# Patient Record
Sex: MALE | Race: WHITE | NOT HISPANIC OR LATINO
[De-identification: names, ages, dates, MRNs, and addresses within clinical notes are randomized per-mention and may not be internally consistent; named-entity substitution may affect disease eponyms.]

---

## 2017-08-11 PROBLEM — Z00.00 ENCOUNTER FOR PREVENTIVE HEALTH EXAMINATION: Status: ACTIVE | Noted: 2017-08-11

## 2021-03-09 ENCOUNTER — APPOINTMENT (OUTPATIENT)
Dept: HEART AND VASCULAR | Facility: CLINIC | Age: 71
End: 2021-03-09
Payer: COMMERCIAL

## 2021-03-09 ENCOUNTER — NON-APPOINTMENT (OUTPATIENT)
Age: 71
End: 2021-03-09

## 2021-03-09 VITALS
HEART RATE: 80 BPM | OXYGEN SATURATION: 98 % | BODY MASS INDEX: 26.67 KG/M2 | HEIGHT: 68 IN | SYSTOLIC BLOOD PRESSURE: 167 MMHG | WEIGHT: 176 LBS | TEMPERATURE: 97 F | RESPIRATION RATE: 14 BRPM | DIASTOLIC BLOOD PRESSURE: 90 MMHG

## 2021-03-09 DIAGNOSIS — I10 ESSENTIAL (PRIMARY) HYPERTENSION: ICD-10-CM

## 2021-03-09 PROCEDURE — 99203 OFFICE O/P NEW LOW 30 MIN: CPT

## 2021-03-09 PROCEDURE — 99072 ADDL SUPL MATRL&STAF TM PHE: CPT

## 2021-03-09 NOTE — ASSESSMENT
[FreeTextEntry1] : 69 yo M with past medical history of hypertension, diabetes, HLD, DM, CVA  here for first evaluation\par \par HTN\par -Uncontrolled today but the patient did not take his medications\par -Please obtain records from his primary cardiologist including recent blood test\par -Stressed importance of medical compliance\par -Echocardiogram\par \par \par HLD\par -obtain lipid panel form primary cardiologist \par \par ROUTINE CARDIOLOGY EXAM \par -Patient has multiple risk factors for CAD, please obtain records from his primary cardiologist.\par -Echocardiogram to assess for any wall motion abnormalities.\par -Consider stress testing/ CTA if  onset of symptoms\par \par -Followup in 2 weeks for review of medications and documentation

## 2021-03-09 NOTE — HISTORY OF PRESENT ILLNESS
[FreeTextEntry1] : 71 yo M with past medical history of hypertension, diabetes, HLD, DM, CVA  here for first evaluation\par the patient was previously by a different cardiologist (Dr. kunz [phone]) but has to change provider due to insurance issues. \par Denies chest pain, shortness of breath, palpitations, syncope, presyncope, LE edema, orthopnea. \par \par Reports walking for miles without any symptoms\par Does not remember which meds he is taking and today he did not take them\par Was told he had CKD and recently evaluated by a nephrologist ( no documentation of labs available) \par \par \par PMH\par HTN\par DM\par HLD\par CVA\par \par ALL\par NKDA\par \par MEDS\par JANUVIA \par BP meds ? \par \par SH\par no smoke, no etoh, no drugs\par \par EKG 3/9/2021\par SR, RBBB, 1st degree AVB

## 2021-03-09 NOTE — PHYSICAL EXAM
[General Appearance - Well Developed] : well developed [Normal Appearance] : normal appearance [Well Groomed] : well groomed [General Appearance - Well Nourished] : well nourished [No Deformities] : no deformities [General Appearance - In No Acute Distress] : no acute distress [Normal Oral Mucosa] : normal oral mucosa [No Oral Pallor] : no oral pallor [No Oral Cyanosis] : no oral cyanosis [Normal Jugular Venous A Waves Present] : normal jugular venous A waves present [Normal Jugular Venous V Waves Present] : normal jugular venous V waves present [No Jugular Venous Butler A Waves] : no jugular venous butler A waves [Heart Rate And Rhythm] : heart rate and rhythm were normal [Heart Sounds] : normal S1 and S2 [Murmurs] : no murmurs present [Respiration, Rhythm And Depth] : normal respiratory rhythm and effort [Exaggerated Use Of Accessory Muscles For Inspiration] : no accessory muscle use [Auscultation Breath Sounds / Voice Sounds] : lungs were clear to auscultation bilaterally [Abdomen Soft] : soft [Abdomen Tenderness] : non-tender [Abdomen Mass (___ Cm)] : no abdominal mass palpated [Abnormal Walk] : normal gait [Gait - Sufficient For Exercise Testing] : the gait was sufficient for exercise testing [Nail Clubbing] : no clubbing of the fingernails [Cyanosis, Localized] : no localized cyanosis [Petechial Hemorrhages (___cm)] : no petechial hemorrhages [] : no ischemic changes

## 2021-03-16 ENCOUNTER — APPOINTMENT (OUTPATIENT)
Dept: HEART AND VASCULAR | Facility: CLINIC | Age: 71
End: 2021-03-16
Payer: COMMERCIAL

## 2021-03-16 PROCEDURE — 93306 TTE W/DOPPLER COMPLETE: CPT

## 2021-03-16 PROCEDURE — 99072 ADDL SUPL MATRL&STAF TM PHE: CPT

## 2021-03-30 ENCOUNTER — APPOINTMENT (OUTPATIENT)
Dept: HEART AND VASCULAR | Facility: CLINIC | Age: 71
End: 2021-03-30
Payer: MEDICARE

## 2021-03-30 VITALS
OXYGEN SATURATION: 98 % | DIASTOLIC BLOOD PRESSURE: 85 MMHG | WEIGHT: 176 LBS | HEART RATE: 72 BPM | HEIGHT: 68 IN | BODY MASS INDEX: 26.67 KG/M2 | SYSTOLIC BLOOD PRESSURE: 142 MMHG | TEMPERATURE: 98.1 F | RESPIRATION RATE: 15 BRPM

## 2021-03-30 PROCEDURE — 99214 OFFICE O/P EST MOD 30 MIN: CPT

## 2021-03-30 PROCEDURE — 99072 ADDL SUPL MATRL&STAF TM PHE: CPT

## 2021-03-30 NOTE — PHYSICAL EXAM
[General Appearance - Well Developed] : well developed [Normal Appearance] : normal appearance [Well Groomed] : well groomed [General Appearance - Well Nourished] : well nourished [No Deformities] : no deformities [General Appearance - In No Acute Distress] : no acute distress [Normal Oral Mucosa] : normal oral mucosa [No Oral Pallor] : no oral pallor [No Oral Cyanosis] : no oral cyanosis [Normal Jugular Venous A Waves Present] : normal jugular venous A waves present [Normal Jugular Venous V Waves Present] : normal jugular venous V waves present [No Jugular Venous Butler A Waves] : no jugular venous butler A waves [Heart Rate And Rhythm] : heart rate and rhythm were normal [Heart Sounds] : normal S1 and S2 [Murmurs] : no murmurs present [Respiration, Rhythm And Depth] : normal respiratory rhythm and effort [Exaggerated Use Of Accessory Muscles For Inspiration] : no accessory muscle use [Auscultation Breath Sounds / Voice Sounds] : lungs were clear to auscultation bilaterally [Abdomen Soft] : soft [Abdomen Tenderness] : non-tender [Abdomen Mass (___ Cm)] : no abdominal mass palpated [Abnormal Walk] : normal gait [Gait - Sufficient For Exercise Testing] : the gait was sufficient for exercise testing [Cyanosis, Localized] : no localized cyanosis [Nail Clubbing] : no clubbing of the fingernails [Petechial Hemorrhages (___cm)] : no petechial hemorrhages [] : no ischemic changes

## 2021-03-30 NOTE — HISTORY OF PRESENT ILLNESS
[FreeTextEntry1] : Cardiology Dr. THI THIBODEAUX  tel:  [phone]\par Nephrology : Dr. ALY HARRISON tel [phone] \par Internal Medicien: Fuad Kerns tel  [phone]\par \par \par 71 yo M with past medical history of hypertension, diabetes, HLD, DM, CVA  here for follow up. \par the patient was previously by a different cardiologist (Dr. Jones [phone]) but has to change provider due to insurance issues. \par Denies chest pain, shortness of breath, palpitations, syncope, presyncope, LE edema, orthopnea. \par \par Reports walking for miles without any symptoms but reports he is now experiencing ROWE and has to walk more slowly than before. \par \par \par PMH\par HTN\par DM\par HLD\par CVA\par \par ALL\par NKDA\par \par MEDS\par lisinopril 40 mg daily \par hydralazine 25 mg po tid \par amlodipine 10 mg daily \par metoprolol succinate 100 mg daily \par atorvastatin 40 mg daily \par januvia\par vit D \par \par \par SH\par no smoke, no etoh, no drugs\par \par EKG 3/9/2021\par SR, RBBB, 1st degree AVB\par \par ECHO 3/16/2021\par LVEF is 55-60%\par Moderate LVH and\par Mild to moderate AI\par Aortic  valve is calcified, cannot rule out bicuspid valve\par Reccomend CTA or cardiac MRI for better assessment of aortic valve and proximal ascending aorta (mildly dilated as per report)\par \par LABS 3/9/2021\par creat 2.5\par

## 2021-03-30 NOTE — ASSESSMENT
[FreeTextEntry1] : 69 yo M with past medical history of hypertension, diabetes, HLD, DM, CVA  here for follow up \par \par ROWE\par - in the setting of his factors, abnormal EKG and dyspnea on exertion will obtain a nuclear  stress test to rule out CAD\par \par HTN\par -better controlled today \par -cont with current medications \par -appreciate renal recs: re continuing  ACEi\par -Stressed importance of medical compliance\par -Echocardiogram as above\par \par HLD\par -obtain lipid panel form primary cardiologist \par \par Followup in 3 weeks for nuc stress test results \par \par

## 2021-05-04 ENCOUNTER — NON-APPOINTMENT (OUTPATIENT)
Age: 71
End: 2021-05-04

## 2021-05-04 ENCOUNTER — APPOINTMENT (OUTPATIENT)
Dept: HEART AND VASCULAR | Facility: CLINIC | Age: 71
End: 2021-05-04

## 2021-05-06 ENCOUNTER — APPOINTMENT (OUTPATIENT)
Dept: HEART AND VASCULAR | Facility: CLINIC | Age: 71
End: 2021-05-06
Payer: MEDICARE

## 2021-05-06 VITALS
WEIGHT: 176 LBS | HEART RATE: 81 BPM | RESPIRATION RATE: 14 BRPM | HEIGHT: 68 IN | OXYGEN SATURATION: 98 % | DIASTOLIC BLOOD PRESSURE: 85 MMHG | BODY MASS INDEX: 26.67 KG/M2 | SYSTOLIC BLOOD PRESSURE: 164 MMHG | TEMPERATURE: 98.5 F

## 2021-05-06 VITALS — HEART RATE: 82 BPM | SYSTOLIC BLOOD PRESSURE: 140 MMHG | DIASTOLIC BLOOD PRESSURE: 78 MMHG

## 2021-05-06 PROCEDURE — 99215 OFFICE O/P EST HI 40 MIN: CPT

## 2021-05-06 PROCEDURE — 99072 ADDL SUPL MATRL&STAF TM PHE: CPT

## 2021-05-06 NOTE — PHYSICAL EXAM
[General Appearance - Well Developed] : well developed [Normal Appearance] : normal appearance [Well Groomed] : well groomed [General Appearance - Well Nourished] : well nourished [No Deformities] : no deformities [General Appearance - In No Acute Distress] : no acute distress [Normal Oral Mucosa] : normal oral mucosa [No Oral Pallor] : no oral pallor [No Oral Cyanosis] : no oral cyanosis [Normal Jugular Venous A Waves Present] : normal jugular venous A waves present [Normal Jugular Venous V Waves Present] : normal jugular venous V waves present [No Jugular Venous Butler A Waves] : no jugular venous butler A waves [Heart Rate And Rhythm] : heart rate and rhythm were normal [Heart Sounds] : normal S1 and S2 [Murmurs] : no murmurs present [Exaggerated Use Of Accessory Muscles For Inspiration] : no accessory muscle use [Respiration, Rhythm And Depth] : normal respiratory rhythm and effort [Auscultation Breath Sounds / Voice Sounds] : lungs were clear to auscultation bilaterally [Abdomen Soft] : soft [Abdomen Tenderness] : non-tender [Abdomen Mass (___ Cm)] : no abdominal mass palpated [Abnormal Walk] : normal gait [Gait - Sufficient For Exercise Testing] : the gait was sufficient for exercise testing [Nail Clubbing] : no clubbing of the fingernails [Cyanosis, Localized] : no localized cyanosis [Petechial Hemorrhages (___cm)] : no petechial hemorrhages [] : no ischemic changes

## 2021-05-12 ENCOUNTER — APPOINTMENT (OUTPATIENT)
Dept: HEART AND VASCULAR | Facility: CLINIC | Age: 71
End: 2021-05-12
Payer: MEDICARE

## 2021-05-12 DIAGNOSIS — Z01.812 ENCOUNTER FOR PREPROCEDURAL LABORATORY EXAMINATION: ICD-10-CM

## 2021-05-12 PROCEDURE — ZZZZZ: CPT

## 2021-05-13 ENCOUNTER — APPOINTMENT (OUTPATIENT)
Dept: HEART AND VASCULAR | Facility: CLINIC | Age: 71
End: 2021-05-13
Payer: MEDICARE

## 2021-05-13 DIAGNOSIS — R94.39 ABNORMAL RESULT OF OTHER CARDIOVASCULAR FUNCTION STUDY: ICD-10-CM

## 2021-05-13 DIAGNOSIS — R06.00 DYSPNEA, UNSPECIFIED: ICD-10-CM

## 2021-05-13 LAB — SARS-COV-2 N GENE NPH QL NAA+PROBE: NOT DETECTED

## 2021-05-13 PROCEDURE — 93306 TTE W/DOPPLER COMPLETE: CPT

## 2021-05-13 NOTE — ASSESSMENT
[FreeTextEntry1] : 71 yo M with past medical history of hypertension, diabetes, HLD, DM, CVA  here for follow up and nuc stress test results \par \par \par ROWE\par - in the setting of his factors, abnormal EKG and dyspnea on exertion, with positive nuc stress test, will schedule for cardiac catheterization at [facility] on 5/14\par -Covid testing as per hospital protocol in [place]\par -obtain labs from nephrology (reports recent labs and visit on 5/7)\par -cont with aspirin, atorvastatin, beta-blocker\par \par \par HFrEF\par -newly diagnosed on recent nuc stress test (normal EF on echo)\par -the patient is euvolemic and NYHA I-II symptoms\par -cath as above to define coronary anatomy\par -consider repeating echo to assess EF in the setting of discrepancy \par -cont with ACEi, BB, hydralazine\par -if EF confirmed depressed will start entresto pending lab results for renal function\par \par \par HTN\par -better controlled today on second BP check\par -cont with current medications \par -appreciate renal recs: re continuing  ACEi\par -Stressed importance of medical compliance\par - repeat Echocardiogram as above\par \par HLD\par -obtain lipid panel form primary cardiologist \par \par Followup post cath\par \par **ADDENDUM 5/13/2021**\par creatinine 2.73 on 5/12/2021 (uptrending)\par in the setting of worsening renal failure cardiac catheterization will be postponed\par recc renal evaluation, recc med compliance, stop ACEi (uptitrated hydralazine to 50 mg po tid)\par -recc also repeat echo in order to assess LVEF ( normal on echo 3/16/2021, severely depressed on nuc 5/2/2021)\par -f/u in 2 weeks \par \par

## 2021-05-13 NOTE — HISTORY OF PRESENT ILLNESS
[FreeTextEntry1] : Cardiology Dr. THI THIBODEAUX  tel:  [phone] \par Nephrology : Dr. ALY HARRISON tel [phone] \par Internal Medicien: Fuad Kerns tel  [phone]\par \par \par  \par 69 yo M with past medical history of hypertension, diabetes, HLD, DM, CVA  here for follow up for nuc stress results. \par The patient reports mild ROWE when climbing stairs\par Denies chest pain, palpitations, syncope, presyncope, LE edema, orthopnea.  \par Had positive nuc stress test (results as above)\par Reports compliance with his meds, will see nephrology tomorrow\par \par \par \par As per previous note: \par 69 yo M with past medical history of hypertension, diabetes, HLD, DM, CVA  here for follow up. \par the patient was previously by a different cardiologist (Dr. Jones [phone]) but has to change provider due to insurance issues. \par Denies chest pain, shortness of breath, palpitations, syncope, presyncope, LE edema, orthopnea. \par \par Reports walking for miles without any symptoms but reports he is now experiencing ROWE and has to walk more slowly than before. \par \par \par PMH\par HTN\par DM\par HLD\par CVA\par \par ALL\par NKDA\par \par MEDS\par lisinopril 40 mg daily \par hydralazine 25 mg po tid \par amlodipine 10 mg daily \par metoprolol succinate 100 mg daily \par atorvastatin 40 mg daily \par januvia\par vit D \par \par \par SH\par no smoke, no etoh, no drugs\par \par EKG 3/9/2021\par SR, RBBB, 1st degree AVB\par \par ECHO 3/16/2021\par LVEF is 55-60%\par Moderate LVH and\par Mild to moderate AI\par Aortic  valve is calcified, cannot rule out bicuspid valve\par Reccomend CTA or cardiac MRI for better assessment of aortic valve and proximal ascending aorta (mildly dilated as per report)\par \par LABS 3/9/2021\par creat 2.5\par \par \par NUC STRESS TEST 5/2/2021\par Large size reversible perfusion defect of moderate intensity involving the entire ( basal-apical) lateral/inferolateral myocardial walls. \par EF 30% \par \par

## 2021-05-14 ENCOUNTER — NON-APPOINTMENT (OUTPATIENT)
Age: 71
End: 2021-05-14

## 2021-05-17 RX ORDER — HYDRALAZINE HYDROCHLORIDE 50 MG/1
50 TABLET ORAL 3 TIMES DAILY
Qty: 90 | Refills: 2 | Status: ACTIVE | COMMUNITY
Start: 2021-05-14 | End: 1900-01-01

## 2021-06-01 ENCOUNTER — RX CHANGE (OUTPATIENT)
Age: 71
End: 2021-06-01

## 2021-07-01 ENCOUNTER — APPOINTMENT (OUTPATIENT)
Dept: HEART AND VASCULAR | Facility: CLINIC | Age: 71
End: 2021-07-01

## 2022-04-08 LAB
ALBUMIN SERPL ELPH-MCNC: 4.1 G/DL
ALP BLD-CCNC: 74 U/L
ALT SERPL-CCNC: 13 U/L
ANION GAP SERPL CALC-SCNC: 13 MMOL/L
AST SERPL-CCNC: 21 U/L
BILIRUB SERPL-MCNC: 0.5 MG/DL
BUN SERPL-MCNC: 50 MG/DL
CALCIUM SERPL-MCNC: 9 MG/DL
CHLORIDE SERPL-SCNC: 103 MMOL/L
CO2 SERPL-SCNC: 21 MMOL/L
CREAT SERPL-MCNC: 2.73 MG/DL
GLUCOSE SERPL-MCNC: 218 MG/DL
POTASSIUM SERPL-SCNC: 4.4 MMOL/L
PROT SERPL-MCNC: 7.4 G/DL
SODIUM SERPL-SCNC: 136 MMOL/L